# Patient Record
(demographics unavailable — no encounter records)

---

## 2017-06-07 NOTE — ED
blaise CERNA Timothy, scribed for Qasim Mace MD on 17 at 1439 .





Abdominal Pain/Female





- HPI Summary


HPI Summary: 





Zuly Zamora is a 57 yo male presenting to [facility] with 6/10 intermittent mid-

epigastric pain radiating to her back for the past month, becoming sharper in 

the past 2 days, and constant since 1300 today. She has lost her appetite 

secondary to pain.She states that she has had similar Sx before and was Dx with 

a gall stone in her tube. She denies any CP, SOB, vomiting, fever, chills, 

melena. She has self-medicated with Tums today. She drove herself to present to 

[facility]. Her Mhx includes cholecystectomy, gall bladder disease, tobacco use. Her 

PCP is Dr. Penny at [facility].





- History of Current Complaint


Chief Complaint: EDAbdPain


Stated Complaint: FLANK PAIN


Time Seen by Provider: 17 14:39


Hx Obtained From: Patient


Onset/Duration: Gradual Onset, Lasting Weeks, Worse Since - 2 days ago


Timing: Intermittent Episode Lasting - 1 hour


Severity Initially: Moderate


Severity Currently: Moderate


Pain Intensity: 6


Pain Scale Used: 0-10 Numeric


Location: Epigastric


Radiates to: Back


Character: Sharp


Associated Signs and Symptoms: Positive: Decreased Appetite.  Negative: Fever, 

Chest Pain, Vomiting


Allergies/Adverse Reactions: 


 Allergies











Allergy/AdvReac Type Severity Reaction Status Date / Time


 


No Known Allergies Allergy   Verified 17 14:26














PMH/Surg Hx/FS Hx/Imm Hx


Cardiovascular History: 


   Comment Only: Other Cardiovascular Problems/Disorders - PAIN LEFT SCAPULA, 

RADIATING TO FRONT OF CHEST


GI History: Reports: Hx Gall Bladder Disease - gall bladder removed


Sensory History: Reports: Hx Contacts or Glasses


Opthamlomology History: Reports: Hx Contacts or Glasses





- Surgical History


Surgery Procedure, Year, and Place: gall bladder removed ,  x2, 

partial hysterectomy


Hx Anesthesia Reactions: No


Infectious Disease History: No


Infectious Disease History: 


   Denies: Traveled Outside the [place] in Last 30 Days





- Family History


Known Family History: Positive: Other - [place]





- Social History


Alcohol Use: None


Substance Use Type: Reports: None


Hx Tobacco Use: Yes


Smoking Status (MU): Former Smoker


Have You Smoked in the Last Year: Yes





Review of Systems


Constitutional: Negative


Negative: Fever, Chills


Eyes: Negative


ENT: Negative


Cardiovascular: Negative


Respiratory: Negative


Positive: Abdominal Pain.  Negative: Vomiting


Genitourinary: Negative


Musculoskeletal: Negative


Skin: Negative


Neurological: Negative


Psychological: Normal


All Other Systems Reviewed And Are Negative: Yes





Physical Exam





- Summary


Physical Exam Summary: 








The patient is well-nourished in no acute distress and in no acute pain.





The skin is warm and diaphoretic and skin color reflects adequate perfusion.





HEENT:  The head is normocephalic and atraumatic. The pupils are equal and 

reactive. The conjunctivae are clear and without drainage. Sclera are without 

jaundice.  Nares are patent and without drainage.  Mouth reveals moist mucous 

membranes and the throat is without erythema and exudate.  The external ears 

are intact. The ear canals are patent and without drainage. The tympanic 

membranes are intact.





Neck is supple with full range of motion and non-tender. There are no carotid 

bruits.  There is no neck vein distension.





Respiratory: Chest is non-tender.  Lungs are clear to auscultation and breath 

sounds are symmetrical and equal.





Cardiovascular: Heart is regular rate and rhythm.  There is no murmur or rub 

auscultated.   There is no peripheral edema and pulses are symmetrical and 

equal.





Abdomen: The abdomen is soft and tender in the epigastric region.  There are 

normal bowel sounds heard in all four quadrants and there is no organomegaly 

palpated. No CVA tenderness or reproducible back pain.





Musculoskeletal: There is no back pain noted.  Extremities are non-tender with 

full range of motion.  There is good capillary refill.  There is no peripheral 

edema or calf tenderness elicited. There are good femoral pulses.





Neurological: Patient is alert and oriented to person, place and time.  The 

patient has symmetrical motor strength in all four extremities.  Cranial nerves 

are grossly intact. Deep tendon reflexes are symmetrical and equal in all four 

extremities.





Psychiatric: The patient has an appropriate affect and does not exhibit any 

anxiety or depression. 


Triage Information Reviewed: Yes


Vital Signs On Initial Exam: 


 Initial Vitals











Temp Pulse Resp BP Pulse Ox


 


 98.2 F   79   16   159/95   100 


 


 17 14:19  17 14:19  17 14:19  17 14:19  17 14:19











Vital Signs Reviewed: Yes





Diagnostics





- Vital Signs


 Vital Signs











  Temp Pulse Resp BP Pulse Ox


 


 06/07/17 14:19  98.2 F  75  16  158/95  100














- Laboratory


Lab Results: 


 Lab Results











  17 Range/Units





  15:10 15:10 15:10 


 


WBC  7.9    (3.5-10.8)  10^3/ul


 


RBC  5.29    (4.0-5.4)  10^6/ul


 


Hgb  15.6    (12.0-16.0)  g/dl


 


Hct  47    (35-47)  %


 


MCV  88    (80-97)  fL


 


MCH  30    (27-31)  pg


 


MCHC  33    (31-36)  g/dl


 


RDW  13    (10.5-15)  %


 


Plt Count  162    (150-450)  10^3/ul


 


MPV  8    (7.4-10.4)  um3


 


Neut % (Auto)  65.0    (38-83)  %


 


Lymph % (Auto)  27.2    (25-47)  %


 


Mono % (Auto)  5.8    (1-9)  %


 


Eos % (Auto)  1.6    (0-6)  %


 


Baso % (Auto)  0.4    (0-2)  %


 


Absolute Neuts (auto)  5.2    (1.5-7.7)  10^3/ul


 


Absolute Lymphs (auto)  2.2    (1.0-4.8)  10^3/ul


 


Absolute Monos (auto)  0.5    (0-0.8)  10^3/ul


 


Absolute Eos (auto)  0.1    (0-0.6)  10^3/ul


 


Absolute Basos (auto)  0    (0-0.2)  10^3/ul


 


Absolute Nucleated RBC  0    10^3/ul


 


Nucleated RBC %  0    


 


Sodium   138   (133-145)  mmol/L


 


Potassium   3.7   (3.5-5.0)  mmol/L


 


Chloride   104   (101-111)  mmol/L


 


Carbon Dioxide   27   (22-32)  mmol/L


 


Anion Gap   7   (2-11)  mmol/L


 


BUN   17   (6-24)  mg/dL


 


Creatinine   0.87   (0.51-0.95)  mg/dL


 


Est GFR ( Amer)   86.0   (>60)  


 


Est GFR (Non-Af Amer)   66.9   (>60)  


 


BUN/Creatinine Ratio   19.5   (8-20)  


 


Glucose   119 H   ()  mg/dL


 


Lactic Acid    1.3  (0.5-2.0)  mmol/L


 


Calcium   10.1   (8.6-10.3)  mg/dL


 


Total Bilirubin   0.90   (0.2-1.0)  mg/dL


 


AST   125 H   (13-39)  U/L


 


ALT   111 H   (7-52)  U/L


 


Alkaline Phosphatase   80   ()  U/L


 


C-Reactive Protein   4.65   (< 5.00)  mg/L


 


Total Protein   6.8   (6.4-8.9)  g/dL


 


Albumin   4.3   (3.2-5.2)  g/dL


 


Globulin   2.5   (2-4)  g/dL


 


Albumin/Globulin Ratio   1.7   (1-3)  


 


Amylase   42   ()  U/L


 


Lipase   68   (11.0-82.0)  U/L


 


Urine Color     


 


Urine Appearance     


 


Urine pH     (5-9)  


 


Ur Specific Gravity     (1.010-1.030)  


 


Urine Protein     (Negative)  


 


Urine Ketones     (Negative)  


 


Urine Blood     (Negative)  


 


Urine Nitrate     (Negative)  


 


Urine Bilirubin     (Negative)  


 


Urine Urobilinogen     (Negative)  


 


Ur Leukocyte Esterase     (Negative)  


 


Urine Glucose     (Negative)  














  17 Range/Units





  16:30 


 


WBC   (3.5-10.8)  10^3/ul


 


RBC   (4.0-5.4)  10^6/ul


 


Hgb   (12.0-16.0)  g/dl


 


Hct   (35-47)  %


 


MCV   (80-97)  fL


 


MCH   (27-31)  pg


 


MCHC   (31-36)  g/dl


 


RDW   (10.5-15)  %


 


Plt Count   (150-450)  10^3/ul


 


MPV   (7.4-10.4)  um3


 


Neut % (Auto)   (38-83)  %


 


Lymph % (Auto)   (25-47)  %


 


Mono % (Auto)   (1-9)  %


 


Eos % (Auto)   (0-6)  %


 


Baso % (Auto)   (0-2)  %


 


Absolute Neuts (auto)   (1.5-7.7)  10^3/ul


 


Absolute Lymphs (auto)   (1.0-4.8)  10^3/ul


 


Absolute Monos (auto)   (0-0.8)  10^3/ul


 


Absolute Eos (auto)   (0-0.6)  10^3/ul


 


Absolute Basos (auto)   (0-0.2)  10^3/ul


 


Absolute Nucleated RBC   10^3/ul


 


Nucleated RBC %   


 


Sodium   (133-145)  mmol/L


 


Potassium   (3.5-5.0)  mmol/L


 


Chloride   (101-111)  mmol/L


 


Carbon Dioxide   (22-32)  mmol/L


 


Anion Gap   (2-11)  mmol/L


 


BUN   (6-24)  mg/dL


 


Creatinine   (0.51-0.95)  mg/dL


 


Est GFR ( Amer)   (>60)  


 


Est GFR (Non-Af Amer)   (>60)  


 


BUN/Creatinine Ratio   (8-20)  


 


Glucose   ()  mg/dL


 


Lactic Acid   (0.5-2.0)  mmol/L


 


Calcium   (8.6-10.3)  mg/dL


 


Total Bilirubin   (0.2-1.0)  mg/dL


 


AST   (13-39)  U/L


 


ALT   (7-52)  U/L


 


Alkaline Phosphatase   ()  U/L


 


C-Reactive Protein   (< 5.00)  mg/L


 


Total Protein   (6.4-8.9)  g/dL


 


Albumin   (3.2-5.2)  g/dL


 


Globulin   (2-4)  g/dL


 


Albumin/Globulin Ratio   (1-3)  


 


Amylase   ()  U/L


 


Lipase   (11.0-82.0)  U/L


 


Urine Color  Yellow  


 


Urine Appearance  Clear  


 


Urine pH  6.0  (5-9)  


 


Ur Specific Gravity  1.013  (1.010-1.030)  


 


Urine Protein  Negative  (Negative)  


 


Urine Ketones  Negative  (Negative)  


 


Urine Blood  Negative  (Negative)  


 


Urine Nitrate  Negative  (Negative)  


 


Urine Bilirubin  Negative  (Negative)  


 


Urine Urobilinogen  Negative  (Negative)  


 


Ur Leukocyte Esterase  Negative  (Negative)  


 


Urine Glucose  Negative  (Negative)  











Result Diagrams: 


 17 15:10





 17 15:10


Lab Statement: Any lab studies that have been ordered have been reviewed, and 

results considered in the medical decision making process.





- Ultrasound


  ** No standard instances


Ultrasound Interpretation: Positive (See Comments) - IMPRESSION: Common duct 

measures up to 11 mm at the mel hepatis. Patient status post cholecystectomy. 

Echogenic foci throughout the liver may represent biliary air. This was noted 

previously on 2015 as well.


Ultrasound Interpretation Completed By: Radiologist - JAYLEN US





Re-Evaluation





- Re-Evaluation


  ** First Eval


Re-Evaluation Time: 17:31


Change: Unchanged


Comment: Discussed lab and imaging studies. Pt understands that her condition 

requires evaluation by a GI specialist and will follow up with her doctor.





Abdominal Pain Fem Course/Dx





- Course


Course Of Treatment: Zuly Zamora is a 57 yo female presenting to [facility] with 6/

10 abd pain for the past month, sharp and worse since 2 days ago, with a Hx of 

similar Sx 2 years a go with a Dx of gallstone. Her medication list is reviewed 

this visit. In the ED course she received morphine for pain management, zofran 

for nausea control, and IV fluids. Her abdominal US suggests widening of the 

common duct up to 11mm at the mel hepatis and S/P cholecystectomy. Dr. Hannah 

was consulted and recommended consult with GI. There is no GI on-call today, 

but the office was paged. While awaiting return of consult call from GI, Pt 

expressed to a nurse that she wishes to be discharged. After clinical 

examination and review of her lab and imaging studies, as well as discussion 

woth Dr. Hananh, she will be discharged home with probable stone in the common 

bile duct.





- Diagnoses


Differential Diagnosis: Positive: Pancreatitis, Other - stone common bile duct, 

dilated common bile duct


Provider Diagnoses: 


 probable common bile duct stone








- Provider Notifications


Discussed Care Of Patient With: Anny Hannah - Discussed Pt condition, 

recommends discussion with GI. 


Time Discussed With Above Provider: 17:08





Discharge





- Discharge Plan


Condition: Stable


Disposition: HOME


Patient Education Materials:  Biliary Colic (ED)


Referrals: 


Yvonne Penny MD [Primary Care Provider] - As Soon As Possible


Additional Instructions: 


Please follow up with your doctor regarding your visit to the emergency 

department today. return to the emergency department with any new or recurring 

symptoms, particularly fever, vomiting, or intractable pain.





The documentation as recorded by the blaise zapata Timothy accurately 

reflects the service I personally performed and the decisions made by me, Qasim Mace MD.

## 2017-06-07 NOTE — RAD
Indication: Common bile duct stones



Real-time sonography of the right upper quadrant was performed.



The liver measures 15 cm in length. Echogenic foci in the liver is noted which may be

related to biliary air. Common duct measures up to 11 mm tapering to 8 mm. The gallbladder

has been resected.



The right kidney measures 11.0 x 4.3 x 4.8 cm with no hydronephrosis.



The pancreas head, neck and proximal body where visualized is unremarkable. The aorta and

inferior vena cava are grossly unremarkable.



IMPRESSION: Common duct measures up to 11 mm at the mel hepatis. Patient status post

cholecystectomy.



Echogenic foci throughout the liver may represent biliary air. This was noted previously

on April 09, 2015 as well.